# Patient Record
Sex: MALE | Race: WHITE | NOT HISPANIC OR LATINO | Employment: FULL TIME | URBAN - NONMETROPOLITAN AREA
[De-identification: names, ages, dates, MRNs, and addresses within clinical notes are randomized per-mention and may not be internally consistent; named-entity substitution may affect disease eponyms.]

---

## 2024-03-13 PROBLEM — D64.9 ANEMIA: Status: ACTIVE | Noted: 2024-03-13

## 2024-03-13 PROBLEM — Z98.61 POST PTCA: Status: ACTIVE | Noted: 2024-03-13

## 2024-03-13 PROBLEM — I25.5 ISCHEMIC CARDIOMYOPATHY: Status: ACTIVE | Noted: 2024-03-13

## 2024-03-13 PROBLEM — I71.40 AAA (ABDOMINAL AORTIC ANEURYSM) (CMS-HCC): Status: ACTIVE | Noted: 2024-03-13

## 2024-03-13 PROBLEM — J44.9 COPD (CHRONIC OBSTRUCTIVE PULMONARY DISEASE) (MULTI): Status: ACTIVE | Noted: 2024-03-13

## 2024-03-13 PROBLEM — I10 ESSENTIAL HYPERTENSION: Status: ACTIVE | Noted: 2024-03-13

## 2024-03-13 PROBLEM — I73.9 PERIPHERAL VASCULAR DISEASE (CMS-HCC): Status: ACTIVE | Noted: 2024-03-13

## 2024-03-13 PROBLEM — E78.5 HYPERLIPIDEMIA: Status: ACTIVE | Noted: 2024-03-13

## 2024-03-13 PROBLEM — I25.10 ATHEROSCLEROSIS OF CORONARY ARTERY: Status: ACTIVE | Noted: 2024-03-13

## 2024-03-13 PROBLEM — F17.200 CURRENT EVERY DAY SMOKER: Status: ACTIVE | Noted: 2024-03-13

## 2024-03-13 PROBLEM — Q27.30 AVM (ARTERIOVENOUS MALFORMATION) (HHS-HCC): Status: ACTIVE | Noted: 2024-03-13

## 2024-03-13 RX ORDER — ROSUVASTATIN CALCIUM 10 MG/1
10 TABLET, COATED ORAL DAILY
COMMUNITY

## 2024-03-13 RX ORDER — AMLODIPINE BESYLATE 2.5 MG/1
1 TABLET ORAL DAILY
COMMUNITY

## 2024-03-13 RX ORDER — NITROGLYCERIN 0.4 MG/1
0.4 TABLET SUBLINGUAL EVERY 5 MIN PRN
COMMUNITY
Start: 2022-01-28

## 2024-07-11 ENCOUNTER — APPOINTMENT (OUTPATIENT)
Dept: CARDIOLOGY | Facility: CLINIC | Age: 74
End: 2024-07-11
Payer: MEDICARE

## 2024-07-11 VITALS
WEIGHT: 125.8 LBS | BODY MASS INDEX: 20.22 KG/M2 | SYSTOLIC BLOOD PRESSURE: 128 MMHG | HEART RATE: 72 BPM | HEIGHT: 66 IN | DIASTOLIC BLOOD PRESSURE: 60 MMHG

## 2024-07-11 DIAGNOSIS — J44.9 CHRONIC OBSTRUCTIVE PULMONARY DISEASE, UNSPECIFIED COPD TYPE (MULTI): ICD-10-CM

## 2024-07-11 DIAGNOSIS — E78.5 HYPERLIPIDEMIA, UNSPECIFIED HYPERLIPIDEMIA TYPE: ICD-10-CM

## 2024-07-11 DIAGNOSIS — Z98.61 POST PTCA: ICD-10-CM

## 2024-07-11 DIAGNOSIS — I10 ESSENTIAL HYPERTENSION: ICD-10-CM

## 2024-07-11 DIAGNOSIS — D64.9 ANEMIA, UNSPECIFIED TYPE: ICD-10-CM

## 2024-07-11 DIAGNOSIS — F17.200 CURRENT EVERY DAY SMOKER: ICD-10-CM

## 2024-07-11 DIAGNOSIS — I71.40 ABDOMINAL AORTIC ANEURYSM (AAA), UNSPECIFIED PART, UNSPECIFIED WHETHER RUPTURED (CMS-HCC): ICD-10-CM

## 2024-07-11 DIAGNOSIS — I73.9 PERIPHERAL VASCULAR DISEASE (CMS-HCC): ICD-10-CM

## 2024-07-11 DIAGNOSIS — I25.5 ISCHEMIC CARDIOMYOPATHY: ICD-10-CM

## 2024-07-11 DIAGNOSIS — I25.10 ATHEROSCLEROSIS OF CORONARY ARTERY OF NATIVE HEART, UNSPECIFIED VESSEL OR LESION TYPE, UNSPECIFIED WHETHER ANGINA PRESENT: ICD-10-CM

## 2024-07-11 PROCEDURE — 1160F RVW MEDS BY RX/DR IN RCRD: CPT | Performed by: INTERNAL MEDICINE

## 2024-07-11 PROCEDURE — 99406 BEHAV CHNG SMOKING 3-10 MIN: CPT | Performed by: INTERNAL MEDICINE

## 2024-07-11 PROCEDURE — 3078F DIAST BP <80 MM HG: CPT | Performed by: INTERNAL MEDICINE

## 2024-07-11 PROCEDURE — 3074F SYST BP LT 130 MM HG: CPT | Performed by: INTERNAL MEDICINE

## 2024-07-11 PROCEDURE — 1159F MED LIST DOCD IN RCRD: CPT | Performed by: INTERNAL MEDICINE

## 2024-07-11 PROCEDURE — 99213 OFFICE O/P EST LOW 20 MIN: CPT | Performed by: INTERNAL MEDICINE

## 2024-07-11 RX ORDER — AMLODIPINE BESYLATE 2.5 MG/1
2.5 TABLET ORAL DAILY
Qty: 90 TABLET | Refills: 3 | Status: SHIPPED | OUTPATIENT
Start: 2024-07-11 | End: 2025-07-11

## 2024-07-11 RX ORDER — FERROUS SULFATE 325(65) MG
325 TABLET ORAL
COMMUNITY
Start: 2024-05-06

## 2024-07-11 RX ORDER — ROSUVASTATIN CALCIUM 10 MG/1
10 TABLET, COATED ORAL DAILY
Qty: 90 TABLET | Refills: 3 | Status: SHIPPED | OUTPATIENT
Start: 2024-07-11 | End: 2025-07-11

## 2024-07-11 NOTE — LETTER
"July 11, 2024     Tyson Lynn DO  [address] [facility] [address]    Patient: Alex Felix   YOB: 1950   Date of Visit: 7/11/2024       Dear Dr. Tyson Lynn, DO:    Thank you for referring Alex Felix to me for evaluation. Below are my notes for this consultation.  If you have questions, please do not hesitate to call me. I look forward to following your patient along with you.       Sincerely,     Alex DoDO      CC: No Recipients  ______________________________________________________________________________________    Subjective   Alex Felix is a 74 y.o. male       Chief Complaint    Annual Exam          74-year-old gentleman returns for follow-up he is doing well he has no cardiovascular events, complaints or nitrate usage or hospitalizations.     He has chronic COPD, shortness of breath, continued tobacco use approximately 1 pack a day. He has a history of remote PCI of the RCA in 2019 and has normal LV function. 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    He has no claudication.  He is still smoking approximately a pack a day; we have counseled him for 3 to 5 minutes in smoking cessation.  Were due for lipid panel    We did review recent July 5 labs from his primary care physician, hemoglobin is 11.5, iron studies were low including iron binding capacity and total iron    Will follow-up again in 1 year on same therapy and await lipid panel         Review of Systems   All other systems reviewed and are negative.           Vitals:    07/11/24 1023   BP: 128/60   BP Location: Right arm   Patient Position: Sitting   Pulse: 72   Weight: 57.1 kg (125 lb 12.8 oz)   Height: 1.676 m (5' 6\")        Objective   Physical Exam  Constitutional:       Appearance: Normal appearance.   HENT:      Nose: Nose normal.   Neck:      Vascular: No carotid bruit.   Cardiovascular: "      Rate and Rhythm: Normal rate.      Pulses: Normal pulses.      Heart sounds: Normal heart sounds.   Pulmonary:      Effort: Pulmonary effort is normal.   Abdominal:      General: Bowel sounds are normal.      Palpations: Abdomen is soft.   Musculoskeletal:         General: Normal range of motion.      Cervical back: Normal range of motion.      Right lower leg: No edema.      Left lower leg: No edema.   Skin:     General: Skin is warm and dry.   Neurological:      General: No focal deficit present.      Mental Status: He is alert.   Psychiatric:         Mood and Affect: Mood normal.         Behavior: Behavior normal.         Thought Content: Thought content normal.         Judgment: Judgment normal.         Allergies  Patient has no known allergies.     Current Medications    Current Outpatient Medications:   •  amLODIPine (Norvasc) 2.5 mg tablet, Take 1 tablet (2.5 mg) by mouth once daily., Disp: , Rfl:   •  ferrous sulfate, 325 mg ferrous sulfate, tablet, Take 1 tablet by mouth once daily with breakfast., Disp: , Rfl:   •  nitroglycerin (Nitrostat) 0.4 mg SL tablet, Place 1 tablet (0.4 mg) under the tongue every 5 minutes if needed., Disp: , Rfl:   •  rosuvastatin (Crestor) 10 mg tablet, Take 1 tablet (10 mg) by mouth once daily., Disp: , Rfl:                      Assessment/Plan   1. Ischemic cardiomyopathy        2. Abdominal aortic aneurysm (AAA), unspecified part, unspecified whether ruptured (CMS-HCC)        3. Atherosclerosis of coronary artery of native heart, unspecified vessel or lesion type, unspecified whether angina present        4. Post PTCA        5. Peripheral vascular disease (CMS-HCC)        6. Chronic obstructive pulmonary disease, unspecified COPD type (Multi)        7. Anemia, unspecified type        8. Current every day smoker        9. BMI 20.0-20.9, adult                 Scribe Attestation  By signing my name below, Jessi DACOSTA LPN  , Scribe   attest that this documentation has been  prepared under the direction and in the presence of Alex Do DO.     Provider Attestation - Scribe documentation    All medical record entries made by the Scribe were at my direction and personally dictated by me. I have reviewed the chart and agree that the record accurately reflects my personal performance of the history, physical exam, discussion and plan.

## 2024-07-11 NOTE — PROGRESS NOTES
"Subjective   Alex Felix is a 74 y.o. male       Chief Complaint    Annual Exam          74-year-old gentleman returns for follow-up he is doing well he has no cardiovascular events, complaints or nitrate usage or hospitalizations.     He has chronic COPD, shortness of breath, continued tobacco use approximately 1 pack a day. He has a history of remote PCI of the RCA in 2019 and has normal LV function. 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    He has no claudication.  He is still smoking approximately a pack a day; we have counseled him for 3 to 5 minutes in smoking cessation.  Were due for lipid panel    We did review recent July 5 labs from his primary care physician, hemoglobin is 11.5, iron studies were low including iron binding capacity and total iron    Will follow-up again in 1 year on same therapy and await lipid panel         Review of Systems   All other systems reviewed and are negative.           Vitals:    07/11/24 1023   BP: 128/60   BP Location: Right arm   Patient Position: Sitting   Pulse: 72   Weight: 57.1 kg (125 lb 12.8 oz)   Height: 1.676 m (5' 6\")        Objective   Physical Exam  Constitutional:       Appearance: Normal appearance.   HENT:      Nose: Nose normal.   Neck:      Vascular: No carotid bruit.   Cardiovascular:      Rate and Rhythm: Normal rate.      Pulses: Normal pulses.      Heart sounds: Normal heart sounds.   Pulmonary:      Effort: Pulmonary effort is normal.   Abdominal:      General: Bowel sounds are normal.      Palpations: Abdomen is soft.   Musculoskeletal:         General: Normal range of motion.      Cervical back: Normal range of motion.      Right lower leg: No edema.      Left lower leg: No edema.   Skin:     General: Skin is warm and dry.   Neurological:      General: No focal deficit present.      Mental Status: He is alert.   Psychiatric:         Mood and Affect: Mood normal.        "  Behavior: Behavior normal.         Thought Content: Thought content normal.         Judgment: Judgment normal.         Allergies  Patient has no known allergies.     Current Medications    Current Outpatient Medications:     amLODIPine (Norvasc) 2.5 mg tablet, Take 1 tablet (2.5 mg) by mouth once daily., Disp: , Rfl:     ferrous sulfate, 325 mg ferrous sulfate, tablet, Take 1 tablet by mouth once daily with breakfast., Disp: , Rfl:     nitroglycerin (Nitrostat) 0.4 mg SL tablet, Place 1 tablet (0.4 mg) under the tongue every 5 minutes if needed., Disp: , Rfl:     rosuvastatin (Crestor) 10 mg tablet, Take 1 tablet (10 mg) by mouth once daily., Disp: , Rfl:                      Assessment/Plan   1. Ischemic cardiomyopathy        2. Abdominal aortic aneurysm (AAA), unspecified part, unspecified whether ruptured (CMS-[facility])        3. Atherosclerosis of coronary artery of native heart, unspecified vessel or lesion type, unspecified whether angina present        4. Post PTCA        5. Peripheral vascular disease (CMS-[facility])        6. Chronic obstructive pulmonary disease, unspecified COPD type (Multi)        7. Anemia, unspecified type        8. Current every day smoker        9. BMI 20.0-20.9, adult                 Scribe Attestation  By signing my name below, IJessi LPN Scribe   attest that this documentation has been prepared under the direction and in the presence of Alex Do DO.     Provider Attestation - Scribe documentation    All medical record entries made by the Scribe were at my direction and personally dictated by me. I have reviewed the chart and agree that the record accurately reflects my personal performance of the history, physical exam, discussion and plan.

## 2025-01-22 ENCOUNTER — PATIENT OUTREACH (OUTPATIENT)
Dept: PRIMARY CARE | Facility: CLINIC | Age: 75
End: 2025-01-22
Payer: MEDICARE

## 2025-01-22 NOTE — PROGRESS NOTES
Discharge Facility:  [facility]  Discharge Diagnosis: unstable Angina  Admission Date:  1/18/25  Discharge Date:  1/21/25    PCP Appointment Date:  TBD  Specialist Appointment Date:   FEB 6 2025 02:00 PM - Cardiology Clinic Visit  [facility] - Alex Do DO   Message sent for sooner appt  Hospital Encounter and Summary Linked: Yes  See discharge assessment below for further details     Engagement  Call Start Time: 0945 (1/22/2025  9:49 AM)    Medications  Medications reviewed with patient/caregiver?: Yes (1/22/2025  9:49 AM)  Is the patient having any side effects they believe may be caused by any medication additions or changes?: No (1/22/2025  9:49 AM)  Does the patient have all medications ordered at discharge?: -- (Picking up today) (1/22/2025  9:49 AM)  Prescription Comments: THis CM does not have a med req but patient verbalizes Lipitor, Plavix, Imdur and Metoprolol tart (1/22/2025  9:49 AM)    Appointments  Does the patient have a primary care provider?: Yes (1/22/2025  9:49 AM)  Care Management Interventions: Advised patient to make appointment (1/22/2025  9:49 AM)  Care Management Interventions: Advised to reschedule appointment (Sending message for sooner appt) (1/22/2025  9:49 AM)  Follow Up Tasks: Appointments (1/22/2025  9:49 AM)    Self Management  What is the home health agency?: n/a (1/22/2025  9:49 AM)  What Durable Medical Equipment (DME) was ordered?: n/a (1/22/2025  9:49 AM)    Patient Teaching  Does the patient have access to their discharge instructions?: Yes (1/22/2025  9:49 AM)  What is the patient's perception of their health status since discharge?: Improving (1/22/2025  9:49 AM)  Is the patient/caregiver able to teach back the hierarchy of who to call/visit for symptoms/problems? PCP, Specialist, Home Health nurse, Urgent Care, ED, 911: Yes (1/22/2025  9:49 AM)  Patient/Caregiver Education Comments: Reviewed medications that patient verbalizes (1/22/2025  9:49 AM)    Wrap  Up  Wrap Up Additional Comments: Patient denies any CP, SOBor HA. Reviewed new medications that patient verbalizes and patient states understanding of indication. REviwed upcoming appt with Dr Melgoza will send message for sooner appt. This CM gives contact information for non urgent matters (1/22/2025  9:49 AM)

## 2025-02-05 ENCOUNTER — PATIENT OUTREACH (OUTPATIENT)
Dept: CARDIOLOGY | Facility: CLINIC | Age: 75
End: 2025-02-05
Payer: MEDICARE

## 2025-02-05 NOTE — PROGRESS NOTES
Call regarding after hospitalization. Patient has an appt tomorrow with Cardiology.  At time of outreach call the patient feels as if their condition has improved since last visit.  Reviewed the PCP appointment with the pt and addressed any questions or concerns. Patient is currently at work

## 2025-02-06 ENCOUNTER — APPOINTMENT (OUTPATIENT)
Dept: CARDIOLOGY | Facility: CLINIC | Age: 75
End: 2025-02-06
Payer: MEDICARE

## 2025-02-06 ENCOUNTER — TELEPHONE (OUTPATIENT)
Dept: CARDIOLOGY | Facility: CLINIC | Age: 75
End: 2025-02-06

## 2025-02-06 VITALS
DIASTOLIC BLOOD PRESSURE: 56 MMHG | HEART RATE: 80 BPM | HEIGHT: 66 IN | SYSTOLIC BLOOD PRESSURE: 124 MMHG | BODY MASS INDEX: 21.05 KG/M2 | WEIGHT: 131 LBS

## 2025-02-06 DIAGNOSIS — D64.9 ANEMIA, UNSPECIFIED TYPE: ICD-10-CM

## 2025-02-06 DIAGNOSIS — I21.4 NSTEMI (NON-ST ELEVATED MYOCARDIAL INFARCTION) (MULTI): ICD-10-CM

## 2025-02-06 DIAGNOSIS — J44.9 CHRONIC OBSTRUCTIVE PULMONARY DISEASE, UNSPECIFIED COPD TYPE (MULTI): ICD-10-CM

## 2025-02-06 DIAGNOSIS — I25.10 ASHD (ARTERIOSCLEROTIC HEART DISEASE): ICD-10-CM

## 2025-02-06 DIAGNOSIS — F17.200 CURRENT EVERY DAY SMOKER: ICD-10-CM

## 2025-02-06 DIAGNOSIS — Q27.30 AVM (ARTERIOVENOUS MALFORMATION) (HHS-HCC): ICD-10-CM

## 2025-02-06 PROCEDURE — 1160F RVW MEDS BY RX/DR IN RCRD: CPT | Performed by: INTERNAL MEDICINE

## 2025-02-06 PROCEDURE — 3074F SYST BP LT 130 MM HG: CPT | Performed by: INTERNAL MEDICINE

## 2025-02-06 PROCEDURE — 99215 OFFICE O/P EST HI 40 MIN: CPT | Performed by: INTERNAL MEDICINE

## 2025-02-06 PROCEDURE — 1159F MED LIST DOCD IN RCRD: CPT | Performed by: INTERNAL MEDICINE

## 2025-02-06 PROCEDURE — 4004F PT TOBACCO SCREEN RCVD TLK: CPT | Performed by: INTERNAL MEDICINE

## 2025-02-06 PROCEDURE — 99406 BEHAV CHNG SMOKING 3-10 MIN: CPT | Performed by: INTERNAL MEDICINE

## 2025-02-06 PROCEDURE — 3078F DIAST BP <80 MM HG: CPT | Performed by: INTERNAL MEDICINE

## 2025-02-06 RX ORDER — CLOPIDOGREL BISULFATE 75 MG/1
75 TABLET ORAL DAILY
COMMUNITY
Start: 2025-01-21

## 2025-02-06 RX ORDER — ISOSORBIDE MONONITRATE 30 MG/1
30 TABLET, EXTENDED RELEASE ORAL DAILY
COMMUNITY
Start: 2025-01-21

## 2025-02-06 RX ORDER — ATORVASTATIN CALCIUM 80 MG/1
80 TABLET, FILM COATED ORAL NIGHTLY
COMMUNITY
Start: 2025-01-21

## 2025-02-06 RX ORDER — METOPROLOL TARTRATE 25 MG/1
25 TABLET, FILM COATED ORAL 2 TIMES DAILY
COMMUNITY
Start: 2025-01-22

## 2025-02-06 ASSESSMENT — ENCOUNTER SYMPTOMS: IRREGULAR HEARTBEAT: 1

## 2025-02-06 NOTE — PATIENT INSTRUCTIONS
Please bring all medicines, vitamins, and herbal supplements with you when you come to the office.    Prescriptions will not be filled unless you are compliant with your follow up appointments or have a follow up appointment scheduled as per instruction of your physician. Refills should be requested at the time of your visit.   BMI normal

## 2025-02-06 NOTE — TELEPHONE ENCOUNTER
Heart Cath 87088 DX: I25.10/I21.4/Q27.30 at [facility] is pending however P2P is highly recommended-P2P may be completed by calling [phone] and referencing case#-[id number], Call reference#-Anya VALDOVINOS at [facility]-2/6/25.

## 2025-02-06 NOTE — LETTER
"February 6, 2025     Tyson Lynn DO  [address] [facility][1] [address]    Patient: Alex Felix   YOB: 1950   Date of Visit: 2/6/2025       Dear Dr. Tyson Lynn DO:    Thank you for referring Alex Felix to me for evaluation. Below are my notes for this consultation.  If you have questions, please do not hesitate to call me. I look forward to following your patient along with you.       Sincerely,     Alex DoDO      CC: No Recipients  ______________________________________________________________________________________    Subjective   Alex Felix is a 74 y.o. male       Chief Complaint    Follow-up          74-year-old gentleman returns for follow-up following recent non-ST elevation MI with troponin up to 16,000 associated with severe anemia.  He was supposed to go undergo elective heart catheterization following cardiology consultation however he has persistent anemia and therefore we aborted heart catheterization in favor of treatment for his anemia and initiation of single antiplatelet agent clopidogrel with follow-up blood count.  Most recent blood count reveals a hemoglobin of 9.5 g/dL therefore he is maintaining his blood counts.    He has chronic COPD, shortness of breath, continued tobacco use approximately 1 pack a day. He has a history of remote PCI of the RCA in 2019 and has normal LV function. 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     He has no claudication.  He is still smoking approximately a pack a day; we have counseled him for 3 to 5 minutes in smoking cessation..    My suspicion is that he has ischemia/non-ST elevation MI due to \"watershed\" ischemia from anemia, however cannot rule out progressive coronary disease given his history.    Risks, benefits, alternatives informed decision making process performed with the patient for " "30 minutes this afternoon, will proceed with elective left heart catheterization via the radial approach for recent non-ST elevation MI, with further decision algorithm in regards to revascularization versus conservative management         Review of Systems   Cardiovascular:  Positive for irregular heartbeat.   All other systems reviewed and are negative.           Vitals:    02/06/25 1358   BP: 124/56   BP Location: Left arm   Patient Position: Sitting   Pulse: 80   Weight: 59.4 kg (131 lb)   Height: 1.676 m (5' 6\")        Objective   Physical Exam  Constitutional:       Appearance: Normal appearance.   HENT:      Nose: Nose normal.   Neck:      Vascular: No carotid bruit.   Cardiovascular:      Rate and Rhythm: Normal rate.      Pulses: Normal pulses.      Heart sounds: Normal heart sounds.   Pulmonary:      Effort: Pulmonary effort is normal.   Abdominal:      General: Bowel sounds are normal.      Palpations: Abdomen is soft.   Musculoskeletal:         General: Normal range of motion.      Cervical back: Normal range of motion.      Right lower leg: No edema.      Left lower leg: No edema.   Skin:     General: Skin is warm and dry.   Neurological:      General: No focal deficit present.      Mental Status: He is alert.   Psychiatric:         Mood and Affect: Mood normal.         Behavior: Behavior normal.         Thought Content: Thought content normal.         Judgment: Judgment normal.         Allergies  Patient has no known allergies.     Current Medications    Current Outpatient Medications:   •  amLODIPine (Norvasc) 2.5 mg tablet, Take 1 tablet (2.5 mg) by mouth once daily., Disp: 90 tablet, Rfl: 3  •  atorvastatin (Lipitor) 80 mg tablet, 1 tablet (80 mg) once daily at bedtime., Disp: , Rfl:   •  clopidogrel (Plavix) 75 mg tablet, 1 tablet (75 mg) once daily., Disp: , Rfl:   •  ferrous sulfate, 325 mg ferrous sulfate, tablet, Take 1 tablet (325 mg) by mouth once daily with breakfast., Disp: , Rfl:   •  " isosorbide mononitrate ER (Imdur) 30 mg 24 hr tablet, 1 tablet (30 mg) once daily., Disp: , Rfl:   •  metoprolol tartrate (Lopressor) 25 mg tablet, 1 tablet (25 mg) 2 times a day., Disp: , Rfl:   •  nitroglycerin (Nitrostat) 0.4 mg SL tablet, Place 1 tablet (0.4 mg) under the tongue every 5 minutes if needed., Disp: , Rfl:                      Assessment/Plan   1. ASHD (arteriosclerotic heart disease)        2. NSTEMI (non-ST elevated myocardial infarction) (Multi)        3. Anemia, unspecified type        4. AVM (arteriovenous malformation) ([facility])        5. Chronic obstructive pulmonary disease, unspecified COPD type (Multi)        6. Current every day smoker        7. BMI 21.0-21.9, adult                 Scribe Attestation  By signing my name below, IJessi LPN Scribe   attest that this documentation has been prepared under the direction and in the presence of Alex Do DO.     Provider Attestation - Scribe documentation    All medical record entries made by the Scribe were at my direction and personally dictated by me. I have reviewed the chart and agree that the record accurately reflects my personal performance of the history, physical exam, discussion and plan.

## 2025-02-06 NOTE — PROGRESS NOTES
"Subjective   Alex Felix is a 74 y.o. male       Chief Complaint    Follow-up          74-year-old gentleman returns for follow-up following recent non-ST elevation MI with troponin up to 16,000 associated with severe anemia.  He was supposed to go undergo elective heart catheterization following cardiology consultation however he has persistent anemia and therefore we aborted heart catheterization in favor of treatment for his anemia and initiation of single antiplatelet agent clopidogrel with follow-up blood count.  Most recent blood count reveals a hemoglobin of 9.5 g/dL therefore he is maintaining his blood counts.    He has chronic COPD, shortness of breath, continued tobacco use approximately 1 pack a day. He has a history of remote PCI of the RCA in 2019 and has normal LV function. 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     He has no claudication.  He is still smoking approximately a pack a day; we have counseled him for 3 to 5 minutes in smoking cessation..    My suspicion is that he has ischemia/non-ST elevation MI due to \"watershed\" ischemia from anemia, however cannot rule out progressive coronary disease given his history.    Risks, benefits, alternatives informed decision making process performed with the patient for 30 minutes this afternoon, will proceed with elective left heart catheterization via the radial approach for recent non-ST elevation MI, with further decision algorithm in regards to revascularization versus conservative management         Review of Systems   Cardiovascular:  Positive for irregular heartbeat.   All other systems reviewed and are negative.           Vitals:    02/06/25 1358   BP: 124/56   BP Location: Left arm   Patient Position: Sitting   Pulse: 80   Weight: 59.4 kg (131 lb)   Height: 1.676 m (5' 6\")        Objective   Physical Exam  Constitutional:       Appearance: " Normal appearance.   HENT:      Nose: Nose normal.   Neck:      Vascular: No carotid bruit.   Cardiovascular:      Rate and Rhythm: Normal rate.      Pulses: Normal pulses.      Heart sounds: Normal heart sounds.   Pulmonary:      Effort: Pulmonary effort is normal.   Abdominal:      General: Bowel sounds are normal.      Palpations: Abdomen is soft.   Musculoskeletal:         General: Normal range of motion.      Cervical back: Normal range of motion.      Right lower leg: No edema.      Left lower leg: No edema.   Skin:     General: Skin is warm and dry.   Neurological:      General: No focal deficit present.      Mental Status: He is alert.   Psychiatric:         Mood and Affect: Mood normal.         Behavior: Behavior normal.         Thought Content: Thought content normal.         Judgment: Judgment normal.         Allergies  Patient has no known allergies.     Current Medications    Current Outpatient Medications:     amLODIPine (Norvasc) 2.5 mg tablet, Take 1 tablet (2.5 mg) by mouth once daily., Disp: 90 tablet, Rfl: 3    atorvastatin (Lipitor) 80 mg tablet, 1 tablet (80 mg) once daily at bedtime., Disp: , Rfl:     clopidogrel (Plavix) 75 mg tablet, 1 tablet (75 mg) once daily., Disp: , Rfl:     ferrous sulfate, 325 mg ferrous sulfate, tablet, Take 1 tablet (325 mg) by mouth once daily with breakfast., Disp: , Rfl:     isosorbide mononitrate ER (Imdur) 30 mg 24 hr tablet, 1 tablet (30 mg) once daily., Disp: , Rfl:     metoprolol tartrate (Lopressor) 25 mg tablet, 1 tablet (25 mg) 2 times a day., Disp: , Rfl:     nitroglycerin (Nitrostat) 0.4 mg SL tablet, Place 1 tablet (0.4 mg) under the tongue every 5 minutes if needed., Disp: , Rfl:                      Assessment/Plan   1. ASHD (arteriosclerotic heart disease)        2. NSTEMI (non-ST elevated myocardial infarction) (Multi)        3. Anemia, unspecified type        4. AVM (arteriovenous malformation) ([facility])        5. Chronic obstructive pulmonary  disease, unspecified COPD type (Multi)        6. Current every day smoker        7. BMI 21.0-21.9, adult                 Scribe Attestation  By signing my name below, I, Jessi BOWDEN LPN  , Scribe   attest that this documentation has been prepared under the direction and in the presence of Alex Do DO.     Provider Attestation - Scribe documentation    All medical record entries made by the Scribe were at my direction and personally dictated by me. I have reviewed the chart and agree that the record accurately reflects my personal performance of the history, physical exam, discussion and plan.

## 2025-02-12 ENCOUNTER — TELEPHONE (OUTPATIENT)
Dept: CARDIOLOGY | Facility: CLINIC | Age: 75
End: 2025-02-12
Payer: MEDICARE

## 2025-02-12 LAB
Lab: SLIGHT
NON-UH HIE ANION GAP: 10.6 MEQ/L (ref 6–15)
NON-UH HIE ANISOCYTOSIS: ABNORMAL
NON-UH HIE BASOPHILS # (AUTO): 0.1 10*3/UL (ref 0–0.2)
NON-UH HIE BASOPHILS % (AUTO): 0.9 %
NON-UH HIE BLOOD UREA NITROGEN: 29 MG/DL (ref 7–25)
NON-UH HIE CARBON DIOXIDE: 25 MMOL/L (ref 21–31)
NON-UH HIE CHLORIDE: 109 MMOL/L (ref 98–107)
NON-UH HIE CHOL/HDL RATIO: 2.3
NON-UH HIE CHOLESTEROL: 97 MG/DL (ref 140–200)
NON-UH HIE CREATININE: 1.01 MG/DL (ref 0.7–1.3)
NON-UH HIE EOSINOPHILS # (AUTO): 0.1 10*3/UL (ref 0–0.45)
NON-UH HIE EOSINOPHILS % (AUTO): 1.3 %
NON-UH HIE ESTIMATED GFR: >60 ML/MIN
NON-UH HIE HDL CHOLESTEROL: 43 MG/DL (ref 23–92)
NON-UH HIE HEMATOCRIT: 15 % (ref 38.8–50)
NON-UH HIE HEMOGLOBIN: 4.8 G/DL (ref 13–17)
NON-UH HIE HYPOCHROMASIA: ABNORMAL
NON-UH HIE INR: 1.1
NON-UH HIE LDL CHOLESTEROL,CALCULATED: 44 MG/DL (ref 0–100)
NON-UH HIE LYMPHOCYTES # (AUTO): 0.5 10*3/UL (ref 1–4.8)
NON-UH HIE LYMPHOCYTES % (AUTO): 7.6 %
NON-UH HIE MEAN CORPUSCULAR HEMOGLOBIN: 25.9 PG (ref 27.5–35.2)
NON-UH HIE MEAN CORPUSCULAR HGB CONC: 31.9 G/DL (ref 32.5–35.6)
NON-UH HIE MEAN CORPUSCULAR VOLUME: 81.3 FL (ref 83.5–101)
NON-UH HIE MEAN PLATELET VOLUME: 7.3 FL (ref 6.6–10.1)
NON-UH HIE MICROCYTOSIS: SLIGHT
NON-UH HIE MONOCYTES # (AUTO): 0.4 10*3/UL (ref 0–0.8)
NON-UH HIE MONOCYTES % (AUTO): 7.4 %
NON-UH HIE NEUTROPHILS # (AUTO): 5 10*3/UL (ref 1.8–7.7)
NON-UH HIE NEUTROPHILS % (AUTO): 82.8 %
NON-UH HIE NRBC%: 0.2 /100{WBC} (ref 0–0.5)
NON-UH HIE PARTIAL THROMBOPLASTIN TIME: 28 S (ref 25.1–36.5)
NON-UH HIE PLATELET COUNT: 510 10*3/UL (ref 150–450)
NON-UH HIE PLATELET ESTIMATE: ABNORMAL
NON-UH HIE PLATELET MORPHOLOGY: NORMAL
NON-UH HIE POLYCHROMASIA: ABNORMAL
NON-UH HIE POTASSIUM: 4.6 MMOL/L (ref 3.5–5.1)
NON-UH HIE PROTHROMBIN TIME: 12.3 S (ref 9–12.9)
NON-UH HIE RED BLOOD COUNT: 1.85 10*6/UL (ref 3.9–5.6)
NON-UH HIE RED CELL DISTRIBUTION WIDTH: 17.6 % (ref 12–14.8)
NON-UH HIE SODIUM: 140 MMOL/L (ref 136–145)
NON-UH HIE TRIGLYCERIDE W/REFLEX: 49 MG/DL (ref 0–149)
NON-UH HIE UNCORRECTED WBC: 6.1 10*3/UL (ref 4.1–10.5)
NON-UH HIE VLDL CHOLESTEROL: 9 MG/DL
NON-UH HIE WHITE BLOOD COUNT: 6.1 10*3/UL (ref 4.1–10.5)

## 2025-02-12 NOTE — TELEPHONE ENCOUNTER
Result Communication    Resulted Orders   [facility] Coagulation Profile   Result Value Ref Range    [facility] Prothrombin Time 12.3 9.0 - 12.9 s      Comment:         A hematocrit value greater than 55% may lead to inaccurate   results in coagulation testing. Patients having hematocrit   values >55% require a special collection tube for   coagulation studies.   Please contact the laboratory at [phone] for redraw   instructions.    [facility] INR 1.1       Comment:         INR Therapeutic Range   A) Pre- and Peroperative OAT started two weeks before   surgery.                     NOT HIP SURGERY:   1.5 - 2.5                                HIP SURGERY:        2  -  3   B) Primary and secondary prevention of venous                                THROMBOSIS:         2  -  3   C) Active venous thrombosis, pulmonary embolism   and prevention of recurrent venous thrombosis:   2  -  3      D) Prevention of arterial thromboembolism   including patients with mechanical heart valves: 3  - 4.5    [facility] Partial Thromboplastin Time 28.0 25.1 - 36.5 s      Comment:         A hematocrit value greater than 55% may lead to inaccurate   results in coagulation testing. Patients having hematocrit   values >55% require a special collection tube for   coagulation studies.   Please contact the laboratory at [phone] for redraw   instructions.PERFORMED BY:[facility] [place], [place] [id number] MEDICAL DIRECTORCIERRA OMRTON M.D.   [facility] Scan and CBC   Result Value Ref Range    [facility] White Blood Count 6.1 4.1 - 10.5 10*3/uL    [facility] Uncorrected WBC 6.1 4.1 - 10.5 10*3/uL    [facility] Red Blood Count 1.85 (L) 3.90 - 5.60 10*6/uL    [facility] Hemoglobin 4.8 (L) 13.0 - 17.0 g/dL    [facility] Hematocrit 15.0 (LL) 38.8 - 50.0 %      Comment:         Critical value   result called   at 0909 on 02/12/25    [facility] Mean Corpuscular Volume 81.3 (L) 83.5 -  101 fL    [facility] Mean Corpuscular Hemoglobin 25.9 (L) 27.5 - 35.2 pg    [facility] Mean Corpuscular HGB Conc 31.9 (L) 32.5 - 35.6 g/dL    [facility] Red Cell Distribution Width 17.6 (H) 12.0 - 14.8 %    [facility] Platelet Count 510 (H) 150 - 450 10*3/uL    [facility] Mean Platelet Volume 7.3 6.6 - 10.1 fL    [facility] Neutrophils % (Auto) 82.8 . %    [facility] Lymphocytes % (Auto) 7.6 . %    [facility] Monocytes % (Auto) 7.4 . %    [facility] Eosinophils % (Auto) 1.3 . %    [facility] Basophils % (Auto) 0.9 . %    [facility] NRBC% 0.2 0 - 0.5 /100[WBC]    [facility] Neutrophils # (Auto) 5.0 1.8 - 7.7 10*3/uL    [facility] Lymphocytes # (Auto) 0.5 (L) 1.00 - 4.8 10*3/uL    [facility] Monocytes # (Auto) 0.4 0.0 - 0.8 10*3/uL    [facility] Eosinophils # (Auto) 0.1 0.0 - 0.45 10*3/uL    [facility] Basophils # (Auto) 0.1 0.0 - 0.2 10*3/uL    [facility] Polychromasia Moderate     [facility] Hypochromasia Marked     [facility] Anisocytosis Moderate     [facility] Microcytosis Slight     [facility] Schistocytes Slight     [facility] Platelet Estimate Increased Normal    [facility] Platelet Morphology Normal Normal      Comment:      PERFORMED BY:[facility] [address] [place], [place] [id number] MEDICAL DIRECTORCIERRA MORTON M.D.       3:16 PM      Results were successfully communicated with the  spouse  and they acknowledged their understanding.

## 2025-02-12 NOTE — TELEPHONE ENCOUNTER
----- Message from Alex Do sent at 2/12/2025  9:46 AM EST -----  Regarding: FW:severe anemia  Send to ER if not already admitted  Severe anemia  ----- Message -----  From: Tanvi Mead - Lab Results In  Sent: 2/12/2025   9:44 AM EST  To: Alex Do, DO

## 2025-02-12 NOTE — TELEPHONE ENCOUNTER
[place] [facility]. Nurse states patients HGB 4.8. Spoke to patient. States he is fatigued. Instructed to report to ER for evaluation of low blood count. States is at the hospital visiting his wife and will report to the ER.

## 2025-02-13 ENCOUNTER — TELEPHONE (OUTPATIENT)
Dept: CARDIOLOGY | Facility: CLINIC | Age: 75
End: 2025-02-13
Payer: MEDICARE

## 2025-02-13 NOTE — TELEPHONE ENCOUNTER
Received email from Sahra in the office stating that the Heart Cath for tomorrow has been canceled. I spoke to Henny VALDOVINOS at [facility] who informed me that they highly recommend that Kateryna Cordero NP, keep the P2P appointment that she had scheduled for today for this patient even though the cath is canceled for tomorrow. The rep said that if it is not done and or if the case is denied, that it will take 60 calendar days to resubmit a new case. If it is approved, they will have 90 calendar days to complete it. Call reference#-Henny VALDOVINOS-2/13/25-8:56am.

## 2025-02-13 NOTE — TELEPHONE ENCOUNTER
Spoke to patient. He received 2 units PRBC'S at [facility] yesterday. He was discharged and instructed to contact his hematologist for further testing and follow-up. Dr Shelby.  Per Dr Do heart cath cancelled pending clearance from hematology. Patient verbalized understanding. Sahra to cancel heart cath scheduled 2-14-25.

## 2025-02-14 ENCOUNTER — TELEPHONE (OUTPATIENT)
Dept: CARDIOLOGY | Facility: CLINIC | Age: 75
End: 2025-02-14
Payer: MEDICARE

## 2025-02-14 NOTE — TELEPHONE ENCOUNTER
Kateryna Cordero NP completed the P2P for the patients Heart Cath 41605 DX: I25.10/I21.4/Q27.30 at [facility] and it is approved-Approval # [id number] and is valid from 2/6/25-5/6/25.

## 2025-03-05 ENCOUNTER — PATIENT OUTREACH (OUTPATIENT)
Dept: CARDIOLOGY | Facility: CLINIC | Age: 75
End: 2025-03-05
Payer: MEDICARE

## 2025-04-03 ENCOUNTER — PATIENT OUTREACH (OUTPATIENT)
Dept: CARDIOLOGY | Facility: CLINIC | Age: 75
End: 2025-04-03
Payer: MEDICARE

## 2025-05-01 ENCOUNTER — APPOINTMENT (OUTPATIENT)
Dept: CARDIOLOGY | Facility: CLINIC | Age: 75
End: 2025-05-01
Payer: MEDICARE

## 2025-05-01 VITALS
DIASTOLIC BLOOD PRESSURE: 60 MMHG | BODY MASS INDEX: 20.57 KG/M2 | SYSTOLIC BLOOD PRESSURE: 110 MMHG | WEIGHT: 128 LBS | HEIGHT: 66 IN | HEART RATE: 76 BPM

## 2025-05-01 DIAGNOSIS — J44.9 CHRONIC OBSTRUCTIVE PULMONARY DISEASE, UNSPECIFIED COPD TYPE (MULTI): ICD-10-CM

## 2025-05-01 DIAGNOSIS — Z98.61 POST PTCA: ICD-10-CM

## 2025-05-01 DIAGNOSIS — F17.200 CURRENT EVERY DAY SMOKER: ICD-10-CM

## 2025-05-01 DIAGNOSIS — I20.0 ANGINA PECTORIS, UNSTABLE (MULTI): ICD-10-CM

## 2025-05-01 DIAGNOSIS — I25.10 ASHD (ARTERIOSCLEROTIC HEART DISEASE): ICD-10-CM

## 2025-05-01 NOTE — LETTER
May 1, 2025     Tyson Lynn DO  [address] [facility] [address]    Patient: Alex Felix   YOB: 1950   Date of Visit: 5/1/2025       Dear Dr. Tyson Lynn, :    Thank you for referring Alex Felix to me for evaluation. Below are my notes for this consultation.  If you have questions, please do not hesitate to call me. I look forward to following your patient along with you.       Sincerely,     Alex GriggsDO yaya      CC: No Recipients  ______________________________________________________________________________________    Chief Complaint   Patient presents with   • Follow-up     Results for Cath       Subjective   Alex Felix is a 75 y.o. male     75-year-old gentleman returns for routine cardiovascular follow-up, he has occasional angina, last of which was yesterday; he found out his hemoglobin was 6 g/dL and received 2 units of blood today and is here posttransfusion.  He feels better.    His blood counts have been hovering around 7.2-7.4, heart catheterization has been discontinued altogether due to his chronic severe iron deficiency anemia; and as of today more exacerbated necessitating blood transfusion.  He says likely he will have iron transfusion next week when his insurance approves it.    He has known ASHD, recent non-STEMI with troponins up to 16,000 associated with anemia, we held off on heart catheterization simply because of anemia and active blood cell requirement.  We are awaiting for his blood counts to come up above 8.5-9 g/dL to proceed but at this juncture we will hold off completely, continue with conservative management, continue with single agent antiplatelet therapy in the form of clopidogrel as well as low-dose long-acting isosorbide mononitrate and high intensity statin    We counseled him on tobacco cessation for 5 minutes this afternoon; will follow-up in 6 months         Review of Systems   Cardiovascular:  Positive for chest  "pain.   All other systems reviewed and are negative.           Vitals:    05/01/25 1512   BP: 110/60   BP Location: Left arm   Patient Position: Sitting   Pulse: 76   Weight: 58.1 kg (128 lb)   Height: 1.676 m (5' 6\")        Objective   Physical Exam  Constitutional:       Appearance: Normal appearance.   HENT:      Nose: Nose normal.   Neck:      Vascular: No carotid bruit.   Cardiovascular:      Rate and Rhythm: Normal rate.      Pulses: Normal pulses.      Heart sounds: Normal heart sounds.   Pulmonary:      Effort: Pulmonary effort is normal.   Abdominal:      General: Bowel sounds are normal.      Palpations: Abdomen is soft.   Musculoskeletal:         General: Normal range of motion.      Cervical back: Normal range of motion.      Right lower leg: No edema.      Left lower leg: No edema.   Skin:     General: Skin is warm and dry.   Neurological:      General: No focal deficit present.      Mental Status: He is alert.   Psychiatric:         Mood and Affect: Mood normal.         Behavior: Behavior normal.         Thought Content: Thought content normal.         Judgment: Judgment normal.         Allergies  Patient has no known allergies.     Current Medications  Current Outpatient Medications   Medication Instructions   • amLODIPine (NORVASC) 2.5 mg, oral, Daily   • atorvastatin (LIPITOR) 80 mg, Nightly   • clopidogrel (PLAVIX) 75 mg, Daily   • isosorbide mononitrate ER (IMDUR) 30 mg, Daily   • metoprolol tartrate (LOPRESSOR) 25 mg, 2 times daily   • nitroglycerin (NITROSTAT) 0.4 mg, Every 5 min PRN                        Assessment/Plan   1. ASHD (arteriosclerotic heart disease)  Follow Up In Cardiology      2. Post PTCA        3. Chronic obstructive pulmonary disease, unspecified COPD type (Multi)        4. Angina pectoris, unstable (Multi)        5. Current every day smoker        6. BMI 20.0-20.9, adult                 Scribe Attestation  By signing my name below, Sanjana DACOSTA LPN   , Scrib[employer]   attest that " this documentation has been prepared under the direction and in the presence of Alex Do DO.     Provider Attestation - Scribe documentation    All medical record entries made by the Scribe were at my direction and personally dictated by me. I have reviewed the chart and agree that the record accurately reflects my personal performance of the history, physical exam, discussion and plan.

## 2025-05-01 NOTE — PROGRESS NOTES
"Chief Complaint   Patient presents with    Follow-up     Results for Cath       Subjective   Alex Felix is a 75 y.o. male     75-year-old gentleman returns for routine cardiovascular follow-up, he has occasional angina, last of which was yesterday; he found out his hemoglobin was 6 g/dL and received 2 units of blood today and is here posttransfusion.  He feels better.    His blood counts have been hovering around 7.2-7.4, heart catheterization has been discontinued altogether due to his chronic severe iron deficiency anemia; and as of today more exacerbated necessitating blood transfusion.  He says likely he will have iron transfusion next week when his insurance approves it.    He has known ASHD, recent non-STEMI with troponins up to 16,000 associated with anemia, we held off on heart catheterization simply because of anemia and active blood cell requirement.  We are awaiting for his blood counts to come up above 8.5-9 g/dL to proceed but at this juncture we will hold off completely, continue with conservative management, continue with single agent antiplatelet therapy in the form of clopidogrel as well as low-dose long-acting isosorbide mononitrate and high intensity statin    We counseled him on tobacco cessation for 5 minutes this afternoon; will follow-up in 6 months         Review of Systems   Cardiovascular:  Positive for chest pain.   All other systems reviewed and are negative.           Vitals:    05/01/25 1512   BP: 110/60   BP Location: Left arm   Patient Position: Sitting   Pulse: 76   Weight: 58.1 kg (128 lb)   Height: 1.676 m (5' 6\")        Objective   Physical Exam  Constitutional:       Appearance: Normal appearance.   HENT:      Nose: Nose normal.   Neck:      Vascular: No carotid bruit.   Cardiovascular:      Rate and Rhythm: Normal rate.      Pulses: Normal pulses.      Heart sounds: Normal heart sounds.   Pulmonary:      Effort: Pulmonary effort is normal.   Abdominal:      General: Bowel " sounds are normal.      Palpations: Abdomen is soft.   Musculoskeletal:         General: Normal range of motion.      Cervical back: Normal range of motion.      Right lower leg: No edema.      Left lower leg: No edema.   Skin:     General: Skin is warm and dry.   Neurological:      General: No focal deficit present.      Mental Status: He is alert.   Psychiatric:         Mood and Affect: Mood normal.         Behavior: Behavior normal.         Thought Content: Thought content normal.         Judgment: Judgment normal.         Allergies  Patient has no known allergies.     Current Medications  Current Outpatient Medications   Medication Instructions    amLODIPine (NORVASC) 2.5 mg, oral, Daily    atorvastatin (LIPITOR) 80 mg, Nightly    clopidogrel (PLAVIX) 75 mg, Daily    isosorbide mononitrate ER (IMDUR) 30 mg, Daily    metoprolol tartrate (LOPRESSOR) 25 mg, 2 times daily    nitroglycerin (NITROSTAT) 0.4 mg, Every 5 min PRN                        Assessment/Plan   1. ASHD (arteriosclerotic heart disease)  Follow Up In Cardiology      2. Post PTCA        3. Chronic obstructive pulmonary disease, unspecified COPD type (Multi)        4. Angina pectoris, unstable (Multi)        5. Current every day smoker        6. BMI 20.0-20.9, adult                 Scribe Attestation  By signing my name below, ISanjana LPN, Scribe   attest that this documentation has been prepared under the direction and in the presence of Alex Do DO.     Provider Attestation - Scribe documentation    All medical record entries made by the Scribe were at my direction and personally dictated by me. I have reviewed the chart and agree that the record accurately reflects my personal performance of the history, physical exam, discussion and plan.

## 2025-07-15 ENCOUNTER — APPOINTMENT (OUTPATIENT)
Dept: CARDIOLOGY | Facility: CLINIC | Age: 75
End: 2025-07-15
Payer: MEDICARE

## 2025-11-03 ENCOUNTER — APPOINTMENT (OUTPATIENT)
Dept: CARDIOLOGY | Facility: CLINIC | Age: 75
End: 2025-11-03
Payer: MEDICARE